# Patient Record
Sex: FEMALE | Race: WHITE
[De-identification: names, ages, dates, MRNs, and addresses within clinical notes are randomized per-mention and may not be internally consistent; named-entity substitution may affect disease eponyms.]

---

## 2019-08-29 PROBLEM — Z00.00 ENCOUNTER FOR PREVENTIVE HEALTH EXAMINATION: Status: ACTIVE | Noted: 2019-08-29

## 2023-04-12 ENCOUNTER — APPOINTMENT (OUTPATIENT)
Dept: ORTHOPEDIC SURGERY | Facility: CLINIC | Age: 28
End: 2023-04-12
Payer: COMMERCIAL

## 2023-04-12 VITALS — BODY MASS INDEX: 23.04 KG/M2 | HEIGHT: 68 IN | WEIGHT: 152 LBS

## 2023-04-12 DIAGNOSIS — Z78.9 OTHER SPECIFIED HEALTH STATUS: ICD-10-CM

## 2023-04-12 PROCEDURE — 99203 OFFICE O/P NEW LOW 30 MIN: CPT

## 2023-04-12 NOTE — ASSESSMENT
[FreeTextEntry1] : Left cubital syndrome - reviewed pathoanatomy. Encouraged nighttime cockup wrist bracing and elbow extension bracing. Will obtain LUE EMG/NCV in light of severity of symptoms - patient will follow-up thereafter to discuss results and develop plan-of-care.\par \par F/u after EMG/NCV

## 2023-04-12 NOTE — IMAGING
[de-identified] : Left wrist with no swelling nor erythema. Able to make fist, oppose thumb to small finger and abduct fingers, no overt atrophy, skinny throughout with minimal muscle mass. Mild clawing. +Phalen's, -tinel at carpal, -tinel at Guyon, +tinel at cubital. -froment, -wartenberg. Decreased sensation in median and intact at superficial radial and decreased in small and ulnar ring finger(normal at ulnar hand) prior to provocative testing. <2sec cap refill.

## 2023-04-12 NOTE — HISTORY OF PRESENT ILLNESS
[de-identified] : 28F, RHD, No PMHX presents with numbness/tingling in the small and ring finger for approx 2 weeks. Reports having a diagnosis of carpal tunnel back approx 10 years ago while she was in HS. Denies surgery on the wrist. She reprots all these symptoms in the small and ring finger came on after getting the COVID vaccine approx 1 year ago. Reports purchasinga  weighted blanket recently and unsure if pulling that caused the pain/numbness/tingling.

## 2023-05-02 ENCOUNTER — APPOINTMENT (OUTPATIENT)
Dept: NEUROLOGY | Facility: CLINIC | Age: 28
End: 2023-05-02
Payer: COMMERCIAL

## 2023-05-02 DIAGNOSIS — M79.632 PAIN IN LEFT FOREARM: ICD-10-CM

## 2023-05-02 PROCEDURE — 95912 NRV CNDJ TEST 11-12 STUDIES: CPT

## 2023-05-02 PROCEDURE — 95886 MUSC TEST DONE W/N TEST COMP: CPT | Mod: LT

## 2023-05-03 ENCOUNTER — APPOINTMENT (OUTPATIENT)
Dept: ORTHOPEDIC SURGERY | Facility: CLINIC | Age: 28
End: 2023-05-03
Payer: COMMERCIAL

## 2023-05-03 DIAGNOSIS — G56.22 LESION OF ULNAR NERVE, LEFT UPPER LIMB: ICD-10-CM

## 2023-05-03 DIAGNOSIS — G56.02 CARPAL TUNNEL SYNDROME, LEFT UPPER LIMB: ICD-10-CM

## 2023-05-03 PROCEDURE — 99214 OFFICE O/P EST MOD 30 MIN: CPT | Mod: 57

## 2023-05-03 NOTE — ASSESSMENT
[FreeTextEntry1] : Left CTS, cubital tunnel - reviewed bilateral EMG/NCV results of left mild CTS and left moderate cubital tunnel syndrome. Given patient's severity and continued symptoms despite nonoperative management, patient indicated for decompression.  We discussed the risks, benefits and alternatives to carpal tunnel release including risk of neurovascular injury, wound dehiscence/infection, continued numbness, continued weakness, need for reoperation, DVT and medical complications associated with anesthesia. Discussed that decompression halts progression, but that improvement with existing sensory or motor deficits are not guaranteed to return. Patient understood this conversation and all questions were answered. She will consider options and call if she would like to proceed.\par \par Plan for left endoscopic carpal tunnel release and cubital tunnel decompression under local with sedation. [facility].\par \par F/u for surgery, 10 days thereafter

## 2023-05-03 NOTE — HISTORY OF PRESENT ILLNESS
[de-identified] : 28F, RHD, No PMHX presents with numbness/tingling in the small and ring finger for approx 2 weeks. Reports having a diagnosis of carpal tunnel back approx 10 years ago while she was in HS. Denies surgery on the wrist. She reprots all these symptoms in the small and ring finger came on after getting the COVID vaccine approx 1 year ago. Reports purchasinga  weighted blanket recently and unsure if pulling that caused the pain/numbness/tingling. \par \par 5/3/23: f/u left hand. Reports still having symptoms. Here for EMG results

## 2023-05-03 NOTE — IMAGING
[de-identified] : Left wrist with no swelling nor erythema. Able to make fist, oppose thumb to small finger and abduct fingers, no overt atrophy, skinny throughout with minimal muscle mass. Mild clawing. +Phalen's, -tinel at carpal, -tinel at [place], +tinel at cubital. -froment, +wartenberg. Decreased sensation in median and intact at superficial radial and decreased in small and ulnar ring finger(normal at ulnar hand) prior to provocative testing. <2sec cap refill.

## 2023-05-08 ENCOUNTER — TRANSCRIPTION ENCOUNTER (OUTPATIENT)
Age: 28
End: 2023-05-08

## 2023-08-23 ENCOUNTER — APPOINTMENT (OUTPATIENT)
Dept: ORTHOPEDIC SURGERY | Facility: CLINIC | Age: 28
End: 2023-08-23